# Patient Record
Sex: MALE | Race: ASIAN | URBAN - METROPOLITAN AREA
[De-identification: names, ages, dates, MRNs, and addresses within clinical notes are randomized per-mention and may not be internally consistent; named-entity substitution may affect disease eponyms.]

---

## 2022-03-16 ENCOUNTER — OFFICE VISIT (OUTPATIENT)
Dept: INTERNAL MEDICINE CLINIC | Age: 22
End: 2022-03-16
Payer: COMMERCIAL

## 2022-03-16 VITALS
WEIGHT: 141 LBS | DIASTOLIC BLOOD PRESSURE: 72 MMHG | HEIGHT: 69 IN | OXYGEN SATURATION: 100 % | HEART RATE: 91 BPM | RESPIRATION RATE: 16 BRPM | TEMPERATURE: 98.3 F | BODY MASS INDEX: 20.88 KG/M2 | SYSTOLIC BLOOD PRESSURE: 127 MMHG

## 2022-03-16 DIAGNOSIS — Z00.00 WELLNESS EXAMINATION: Primary | ICD-10-CM

## 2022-03-16 PROCEDURE — 99385 PREV VISIT NEW AGE 18-39: CPT | Performed by: INTERNAL MEDICINE

## 2022-03-16 NOTE — PROGRESS NOTES
Faith Cardenas is a 24 y.o. male  Chief Complaint   Patient presents with    Establish Care     Visit Vitals  /72   Pulse 91   Temp 98.3 °F (36.8 °C)   Resp 16   Ht 5' 9\" (1.753 m)   Wt 141 lb (64 kg)   SpO2 100%   BMI 20.82 kg/m²          HPI  Mr. Rika Tolbert is a 25 yo male with no significant medical history presents to clinic to [facility]. He doesn't have acute complaints on this visit. He is generally healthy, no complaints on this visit. He denies chest pain, shortness of breath, fever, or chills. He reports dental pain and plans to go to Dental office. Patient was scheduled to this visit through the immigration office. He had MMR, varicella, tdap and polio vaccines, quantiferon gold was negative. Social History   - and his wife is in [place]   -doesn't smoke, doesn't drink     Family history   Father: cariomyopathy       . History reviewed. No pertinent past medical history. No Known Allergies       ROS  Review of Systems   All other systems reviewed and are negative. EXAM  Physical Exam  Vitals reviewed. Constitutional:       General: He is not in acute distress. Appearance: Normal appearance. He is not ill-appearing. HENT:      Head: Normocephalic and atraumatic. Right Ear: Tympanic membrane, ear canal and external ear normal. There is no impacted cerumen. Left Ear: Tympanic membrane, ear canal and external ear normal. There is no impacted cerumen. Mouth/Throat:      Mouth: Mucous membranes are moist.   Eyes:      General:         Right eye: No discharge. Extraocular Movements: Extraocular movements intact. Conjunctiva/sclera: Conjunctivae normal.      Pupils: Pupils are equal, round, and reactive to light. Neck:      Vascular: No carotid bruit. Cardiovascular:      Rate and Rhythm: Normal rate and regular rhythm. Heart sounds: Normal heart sounds. No murmur heard. No gallop.     Pulmonary:      Effort: Pulmonary effort is normal.      Breath sounds: Normal breath sounds. No stridor. No wheezing or rhonchi. Abdominal:      General: There is no distension. Palpations: Abdomen is soft. There is no mass. Musculoskeletal:      Cervical back: Normal range of motion and neck supple. No rigidity or tenderness. Right lower leg: No edema. Left lower leg: No edema. Lymphadenopathy:      Cervical: No cervical adenopathy. Skin:     General: Skin is warm. Neurological:      General: No focal deficit present. Mental Status: He is alert and oriented to person, place, and time. Psychiatric:         Mood and Affect: Mood normal.         Health Maintenance Due   Topic Date Due    Hepatitis C Screening  Never done    Depression Screen  Never done    COVID-19 Vaccine (1) Never done    HPV Age 9Y-34Y (3 - Male 2-dose series) Never done    Flu Vaccine (1) Never done     ASSESSMENT/PLAN  Diagnoses and all orders for this visit:    1.  Wellness examination    -encouraged healthy life style : exercise and wholesome balanced diets   -no signs of depression seen   -return as needed or in 1 year       Kaen Chase MD

## 2022-03-16 NOTE — PATIENT INSTRUCTIONS
Ñ?ã ÓáÇãÊ ÈÑÇ? ÞáÈ: ãÑÇÞÈÊ åÇ? ÈåÏÇÔÊ? Heart-Healthy Diet: Care Instructions  ãÑÇÞÈÊ åÇ? ÈåÏÇÔÊ? ÔãÇ    Ñ? ã ÓÇáã ÈÑÇ? ÞáÈ ÍÇæ? ãÞÏÇÑ Ò? ÇÏ? ÓÈÒ? ÌÇÊ¡ ã? æå¡ ÎÔÈÇÑ¡ ÏÇäå æ ÏÇäå åÇ? Çãá ÇÓÊ å ãÞÏÇÑ äã ÏÑ Âä ã ÈÇÔÏ. ÏÑÇ?ä Ñ? ã ãæÇÏ ÛÐÇ? ? ÑÑÈ ãËá æÔÊ¡ ä?Ñ æ ÛÐÇåÇ? ÓÑÎ ÔÏå Èå ãÞÏÇÑ ã? æÌæÏ ÏÇÑÏ. ããä ÇÓÊ ÊÛ? ?Ñ Ñ?ã ÛÐÇ? ? ÈÑÇ? ÊÇä ÓÎÊ ÈÇÔÏ ÇãÇ ÍÊ? ÊÛ??ÑÇÊ æ ä? Ò ã? ÊæÇäÏ ÎØÑ Íãáå ÞáÈ? æ È? ãÇÑ? åÇ? ÞáÈ? ÑÇ ã ääÏ. ãÑÇÞÈÊ ? ? ÑÇäå ? Candy Cliche ÇÕá? ÇÒ ÏÑãÇä æ Ç?ãä? ÔãÇ ÇÓÊ. ÍÊãÇð ÇÒ ÒÔ ÎæÏ æÞÊ åÇ? ãáÇÞÇÊ È? Ñ? Ï æ ÏÑ åãå ÂäåÇ ÍÖæÑ ÏÇÔÊå ÈÇÔ? Ï æ ÇÑ ãÔá? ÏÇÑ? Ï ÈÇ ÒÔ ÎæÏ ÊãÇÓ È? Ñ? Ï. ÝÑ ÈÓ? ÇÑ ÎæÈ? ÇÓÊ å äÊÇ? Ì ÂÒãÇ? Alan Hernadez ÈÏÇä? Ï æ á? Kip Pata ÏÇÑæåÇ? ? Margarie Creamer ãÕÑÝ ã? ä?Ï¡ ÏÇÔÊå ÈÇÔ? Zenovia Netter ã? ?ÊæÇä?Ï ÏÑ ÎÇäå ÇÒ ÎæÏ ãÑÇÞÈÊ ä? Palma Husbands ÍÌã ÛÐÇåÇ? ãÕÑÝ? ÊÇä ÏÞÊ ä? Ï  Billye Dies ÈÑÓÈ åÇ? ÛÐÇ?? ÇÓÊÝÇÏå ä?Ï æ ÈÇ ÛÐÇåÇ? ÊæÕ? å ÔÏå æ ãäÇÓÈ ÈÑÇ? ÎæÏÊÇä ÂÔäÇ Ôæ? Josemanuel Harris åãÇä ãÞÏÇÑ ÇáÑ? ÑÇ ãÕÑÝ ä?Ï å ÈÑÇ? ÍÝÙ æÒä ÓÇáã Èå Âä ä? ÇÒ ÏÇÑ? Ï. ÇÑ áÇÒã ÇÓÊ æÒäÊÇä ÑÇ ã ä? Ï¡ ã? ÒÇä ÇáÑ? ãÕÑÝ? ÊÇä ÈÇ? Ï ãÊÑ ÇÒ ãÞÏÇÑ ÇáÑ? ÈÇÔÏ å ÈÏäÊÇä ã? ÓæÒÇäÏ (ÇÒ ØÑ?Þ æÑÒÔ ? Ç Ï? Ñ ÝÚÇá? Ê åÇ? Ý?Ò??). ã?æå æ ÓÈÒ? ÌÇÊ È? ÔÊÑ? ÈÎæÑ? Ï   åÑ ÑæÒ ã? æå æ ÓÈÒ? ÌÇÊ ãÊäæÚ? ÈÎæÑ? Ï. Èå ÎÕæÕ ã? æå åÇ æ ÓÈÒ? ÌÇÊ Èå Ñä ÓÈÒ Ê? Ñå¡ äÇÑäÌ? ¡ ÞÑãÒ ? Ç ÒÑÏ Ê? Ñå ÈÑÇ? ÊÇä ãäÇÓÈ åÓÊäÏ. äãæäå åÇ? ? ÇÒ Ç?ä ãæÇÑÏ ÚÈÇÑÊäÏ ÇÒ ÇÓÝäÇÌ¡ åæ?Ì¡ åáæ æ ÇäæÇÚ ÊæÊ æ ÊãÔ.  åæ? Ì¡ ÑÝÓ æ ÓÇ? Ñ ÓÈÒ? ÌÇÊ ÑÇ Èå ÚäæÇä ã? Çä æÚÏå ÏÑ ÏÓÊÑÓ ÏÇÔÊå ÈÇÔ? Ï. ã?æå åÇ? ÝÕá ÑÇ ÎÑ? ÏÇÑ? ä?Ï æ ÂäåÇ ÑÇ ÏÑ ÌÇ?? ÈÐÇÑ? Ï å æÓæÓå Ôæ?Ï ÇÒ ÂäåÇ ÈÎæÑ? Michael Kierra ÛÐÇåÇ? ? ÈÒ? Ï å ÍÇæ? ÓÈÒ? ÌÇÊ Ò? ÇÏ? åÓÊäÏ ãËá Óæ æ äæÏá ÈÇ ÓÈÒ? ÌÇÊ ÓÑÎ ÔÏå. ã?ÒÇä ÑÈ? åÇ? ÇÔÈÇÚ ÔÏå ÑÇ ÇåÔ Ïå? Ï   ÈÑÓÈ Ñæ? ÛÐÇåÇ ÑÇ ÈÎæÇä? Ï æ ÑÈ? åÇ? ÇÔÈÇÚ ÔÏå äÎÑ? Ï. Ç?ä ãæÇÑÏ ÈÇÚË ÇÝÒÇ? Milus Notch ÈÑæÒ È? ãÇÑ? åÇ? ÞáÈ? ã? ÔæäÏ.  ÈÑÇ? ÂÔÒ? ÇÒ ÑæÛä Ò? Êæä ? Ç ÇäæáÇ ÇÓÊÝÇÏå ä?Ï.  ÈÌÇ? ÓÑÎ ÑÏä ÛÐÇåÇ¡ ÂäåÇ ÑÇ ÈÒ? Ï¡ ÂÈ Ò ä? Ï¡ ÈÇÈ ? Ç ÈÎÇÑÒ ä?Ï. Huron Shanthi ÌÇ? æÔÊ åÇ?? ÈÇ ÑÈ? ÈÇáÇ ãËá åÇÊ ÏÇ æ ÓæÓ? Ó ÇÒ æÔÊ åÇ?? ÈÇ ÑÈ? ã [id number] äÏ? Sherryle Moder æÞÊ? æÔÊ ÂãÇÏå ã?  ä?Ï åãå ÑÈ? åÇ? ÞÇÈá ãÔÇåÏå ÑÇ ÌÏÇ ä?Ï. Maryella Byers ÌÇ? æÔÊ åÇ? ÑÑÈ¡ ÇÒ ãÇå? ¡ ãÇ? Çä ÈÏæä æÓÊ æ ÌÇ?Ò?ä åÇ? æÔÊ ãËá [id number] Óæ?Ç ÇÓÊÝÇÏå ä?Ï. [id number] Óæ?Ç ãËá ÊæÝæ ããä ÇÓÊ ÛÐÇåÇ? ÈÓ? ÇÑ ÎæÈ? ÈÑÇ? ÞáÈ ÈÇÔäÏ.  Ô? [address] [id number] áÈä? ã ÑÈ ? Ç ÈÏæä ÑÈ? ÇäÊÎÇÈ ä?Ï. ÛÐÇåÇ? ? ÈÎæÑ? Telma Martinez ÍÌã Ý? ÈÑ ÈÇáÇÊÑ? ÏÇÑäÏ   åÑ ÑæÒ ÏÇäå ÛáÇÊ ÈÎæÑ? Ï. ÛÐÇåÇ? ? ãÕÑÝ ä?Ï å ÏÇäå Çãá ÛáÇÊ ÏÇÔÊå ÈÇÔäÏ æ ÍÌã ÈÇáÇ? ? ÇÒ Ý? ÈÑ æ ãæÇÏ ãÛÐ? Leatha Tanner ÎæÏ ÌÇ? ÏåäÏ. äãæäå åÇ? ? ÇÒ ÛÐÇåÇ? ÍÇæ? ÏÇäå Çãá ÚÈÇÑÊäÏ ÇÒ Ìæ Ïæ ÓÑ¡ äÇä åÇ? ÎÊå ÔÏå ÈÇ äÏã Çãá æ ÈÑäÌ Þåæå Ç? Kianna Massa äÇä æ ÊÑ? ÈÇÊ Êå? å ÔÏå ÇÒ ÏÇäå ÛáÇÊ Çãá ÑÇ Èå ÌÇ? äÇä æ Ô? Ñ?ä? åÇ? Êå?å ÔÏå ÇÒ äÏã ãÕÑÝ ä?Ï. ã?ÒÇä ãÕÑÝ äã æ ÓÏ? ã ÑÇ ãÍÏæÏ ä? Ï   ã? ÒÇä ãÕÑÝ äã æ ÓÏ? ã ÑÇ ÇåÔ Ïå? Ï ÊÇ ÝÔÇÑ ÎæäÊÇä ã ÔæÏ.  ÞÈá ÇÒ äã ÒÏä Èå ÛÐÇ¡ Âä ÑÇ [id number] ä? Marvie Carls? ÝÑ ã? ä?Ï áÇÒã ÇÓÊ Èå ÛÐÇ äã ÈÒä? Ï¡ ÝÞØ ãÞÏÇÑ ã? äã ÇÖÇÝå ä?Ï Èå ãÑæÑ ÒãÇä ÐÇÆÞå ÊÇä Èå ã ÈæÏä äã ÚÇÏÊ ã? äÏ.  ãÞÏÇÑ ãÊÑ? LIUYSP¡ ÛÐåÇ? ÂãÇÏå æ Ï? Ñ ãæÇÏ ÛÐÇ? ? ÝÑÂæÑ? ÔÏå ÈÎæÑ? Telma Martinez ÍÌã ÈÇáÇ? ? ÇÒ äã ÏÇÑäÏ. ÈÑÓÈ ãæÇÏ ÛÐÇ? ? ÑÇ ÈÑÑÓ? ä?Ï æ ã? ÒÇä ÓÏ? ã ãæÌæÏ ÏÑ ãæÇÏ ÛÐÇ? ? ÑÇ ÈÈ?ä? Ï.  ÛÐÇåÇ? äÓÑæ? ÑÇ ÇäÊÎÇÈ ä?Ï å ÓÏ? ã ã? ÏÇÑäÏ (ãËá Óæ¡ ÓÈÒ? ÌÇÊ æ áæÈ? Ç). ã?ÒÇä ãÕÑÝ ÔÑ ÑÇ ãÍÏæÏ ä? Nicole Caldron ãÕÑÝ äæÔ? Ïä? åÇ æ ãæÇÏ ÛÐÇ? ? ÍÇæ? ÔÑ ÇÖÇÝ? ÑÇ ãÍÏæÏ ä?Ï. Ç?ä ãæÇÏ ÚÈÇÑÊäÏ ÇÒ ÂÈ äÈÇÊ¡ ÏÓÑ æ äæÔÇÈå åÇ? ÇÒÏÇÑ. ã?ÒÇä ãÕÑÝ Çáá ÑÇ ã ä?Ï.  ãÑÏÇä ÑæÒÇäå È? ÔÊÑ ÇÒ 2 ÈÇÑ æ ÒäÇä È? ÔÊÑ ÇÒ 1 ÈÇÑ Çáá ãÕÑÝ äääÏ. äæÔ? Ïä Çáá Ò? ÇÏ ÈÇÚË È? ãÇÑ? ã? ÔæÏ. å ãæÞÚ ÈÇ? Ï ÈÑÇ? ã ÊãÇÓ È? Ñ? Ï¿  Èå ÏÞÊ ãÑÇÞÈ ÊÛ? ?Kyleigh Fernandez æÖÚ? Ê ÓáÇãÊ? ÎæÏ ÈÇÔ? Ï æ ÏÑ ãæÇÑÏ Ò? Ñ ÍÊãÇð ÈÇ ÒÔ ÎæÏ ÊãÇÓ È? Ñ? ÏRegan Del ä?Ï ÛÐÇåÇ? ? Êå?å ä?Ï å ÈÑÇ? ÓáÇãÊ ÞáÈ ãÝ? Ï ÈÇÔäÏ. ÇÒ ÌÇ ã? ÊæÇä? Ï ÇØáÇÚÇÊ È? ÔÊÑ? ÓÈ ä? Ï¿  ÈÑæ Èå   [email or web address]  æÑæÏ T026 ÏÑ ÇÏÑ ÌÓÊÌæ ÈÑÇ? ÇØáÇÚÇÊ È? Ô? ÊÑ ÏÑÈÇÑå \"Ñ? ã ÓáÇãÊ ÈÑÇ? ÞáÈ: ãÑÇÞÈÊ åÇ? ÈåÏÇÔÊ? Haydee Song \"  Èå ÑæÒ Èå ÊÇÑ? Î: 10 Çäæíå 2022               äÓÎå ÏÇÑÇ? ãÍÊæÇ: 13.2  © 4088-9661 [employer]. ãÑÇÞÈÊ åÇ? ÈåÏÇÔÊ?  ÈÑÇÓÇÓ ãÌæÒ ãÊÎÕÕ ãÑÇÞÈ ÓáÇãÊ ÔãÇ ãØÇÈÞÊ ÏÇÏå ÔÏå ÇÓÊ. ÇÑ ÏÑÈÇÑå ÔÑÇ? Ø ÒÔ? ?Lonza Grosser Ç?ä [id number] åÇ ÓÄÇá? ÏÇÑ? Ï¡ åã? Ôå ã? ÊæÇä? Ï ÇÒ ãÊÎÕÕ?ä ÍæÒå ÓáÇãÊ ÓÄÇá ä? Ï. [employer] åÑæäå ÖãÇäÊ äÇãå ? Ç ÊÚåÏ ÏÑ ÞÈÇá ÇÓÊÝÇÏå ÇÒ Ç?ä XMEITYR ÑÇ ÇÒ ÎæÏ ÓáÈ ã? äÏ.

## 2022-03-16 NOTE — PROGRESS NOTES
1. Have you been to the ER, urgent care clinic since your last visit? Hospitalized since your last visit? No    2. Have you seen or consulted any other health care providers outside of the [address] [place] [address] since your last visit? Include any pap smears or colon screening.  No   Chief Complaint   Patient presents with   [address]

## 2022-08-24 ENCOUNTER — NURSE TRIAGE (OUTPATIENT)
Dept: OTHER | Facility: CLINIC | Age: 22
End: 2022-08-24

## 2022-08-24 NOTE — TELEPHONE ENCOUNTER
Received call from [address] at [facility] with Red Flag Complaint. Pt speaks English. RN noted that pt was speaking, but he speaks very slowly. RN told pt and pt's cousin that there is a translation service and that we can obtain an  who speaks his first language. Pt expressed that was good and he was appreciative. Subjective: Caller states \"He was resting in a side position and awakened with pain in his R shoulder and neck. And the pain goes up in the R neck but can sometimes be felt around his neck. Onset:   Since Saturday, 8/20/22     Pain Severity: 8/10; intermittent  And sometimes lower, at a 5    What has been tried: tylenol - hasn't helped much    Recommended disposition: See PCP within 3 Days    Care advice provided, patient verbalizes understanding; denies any other questions or concerns; instructed to call back for any new or worsening symptoms. Giorgio Andrew, Service Expert, is working to make an appt for pt. Attention Provider: Thank you for allowing me to participate in the care of your patient. The patient was connected to triage in response to information provided to the [facility]. Please do not respond through this encounter as the response is not directed to a shared pool.     Reason for Disposition   [1] MODERATE pain (e.g., interferes with normal activities) AND [2] present > 3 days    Protocols used: Shoulder Pain-ADULT-AH

## 2022-08-25 ENCOUNTER — OFFICE VISIT (OUTPATIENT)
Dept: INTERNAL MEDICINE CLINIC | Age: 22
End: 2022-08-25
Payer: COMMERCIAL

## 2022-08-25 VITALS
TEMPERATURE: 98.5 F | OXYGEN SATURATION: 98 % | SYSTOLIC BLOOD PRESSURE: 123 MMHG | HEART RATE: 78 BPM | BODY MASS INDEX: 21.48 KG/M2 | DIASTOLIC BLOOD PRESSURE: 75 MMHG | RESPIRATION RATE: 18 BRPM | WEIGHT: 145 LBS | HEIGHT: 69 IN

## 2022-08-25 DIAGNOSIS — M79.18 MUSCULOSKELETAL PAIN: Primary | ICD-10-CM

## 2022-08-25 PROCEDURE — 99213 OFFICE O/P EST LOW 20 MIN: CPT | Performed by: INTERNAL MEDICINE

## 2022-08-25 RX ORDER — CYCLOBENZAPRINE HCL 10 MG
10 TABLET ORAL
Qty: 30 TABLET | Refills: 0 | Status: SHIPPED | OUTPATIENT
Start: 2022-08-25 | End: 2022-09-24

## 2022-08-25 RX ORDER — MELOXICAM 7.5 MG/1
7.5 TABLET ORAL DAILY
Qty: 30 TABLET | Refills: 0 | Status: SHIPPED | OUTPATIENT
Start: 2022-08-25 | End: 2022-09-24

## 2022-08-25 NOTE — LETTER
8/25/2022 3:58 PM    Mr. Pat Cruz  [address] [facility]      Mr. Pat Cruz was seen today on 8/25/22 and he has musculoskeletal pain which gets worst with heavy lifting. I recommend to limit the amount of lifting to 20 lbs for a few days until he gets better.          Sincerely,      Shilpi Horta MD

## 2022-08-25 NOTE — PROGRESS NOTES
Linda Chi is a 24 y.o. male    Chief Complaint   Patient presents with    Shoulder Pain     And rt side neck pain since 08/20/2022       Visit Vitals  /75   Pulse 78   Temp 98.5 °F (36.9 °C) (Temporal)   Resp 18   Ht 5' 9\" (1.753 m)   Wt 145 lb (65.8 kg)   SpO2 98%   BMI 21.41 kg/m²       3 most recent PHQ Screens 8/25/2022   Little interest or pleasure in doing things Not at all   Feeling down, depressed, irritable, or hopeless Not at all   Total Score PHQ 2 0       No flowsheet data found. Abuse Screening Questionnaire 3/16/2022   Do you ever feel afraid of your partner? N   Are you in a relationship with someone who physically or mentally threatens you? N   Is it safe for you to go home? Y       1. Have you been to the ER, urgent care clinic since your last visit? Hospitalized since your last visit? No     2. Have you seen or consulted any other health care providers outside of the [address] [place] [address] since your last visit? Include any pap smears or colon screening.  No

## 2022-08-25 NOTE — PROGRESS NOTES
[facility] Notice is a 24 y.o. male  Chief Complaint   Patient presents with    Shoulder Pain     And rt side neck pain since 08/20/2022     Visit Vitals  /75   Pulse 78   Temp 98.5 °F (36.9 °C) (Temporal)   Resp 18   Ht 5' 9\" (1.753 m)   Wt 145 lb (65.8 kg)   SpO2 98%   BMI 21.41 kg/m²          HPI  Mr.Laal Alana Hui present with sudden onset of right scapular pain of 3 days duration. It started suddenly and described as aching sensation that gets exacerbated with lifting heavy objects or any movement. He denies any trauma. He works in [employer] and work involves lifting objects. .  Social History     Socioeconomic History    Marital status: SINGLE   Tobacco Use    Smoking status: Never    Smokeless tobacco: Never   Vaping Use    Vaping Use: Never used   Substance and Sexual Activity    Alcohol use: Never    Drug use: Never    Sexual activity: Never      . History reviewed. No pertinent past medical history. No Known Allergies       ROS  Review of Systems   All other systems reviewed and are negative. EXAM  Physical Exam  Vitals reviewed. Constitutional:       Appearance: Normal appearance. Cardiovascular:      Rate and Rhythm: Normal rate. Heart sounds: Normal heart sounds. No murmur heard. Musculoskeletal:        Arms:       Cervical back: Normal range of motion. Neurological:      Mental Status: He is alert. Health Maintenance Due   Topic Date Due    HPV Age 9Y-34Y (3 - Male 2-dose series) Never done    COVID-19 Vaccine (3 - Booster) 02/28/2022       ASSESSMENT/PLAN    Diagnoses and all orders for this visit:    1. Musculoskeletal pain  -     cyclobenzaprine (FLEXERIL) 10 mg tablet; Take 1 Tablet by mouth three (3) times daily as needed for Muscle Spasm(s) for up to 30 days. -     meloxicam (MOBIC) 7.5 mg tablet; Take 1 Tablet by mouth daily for 30 days.         Valentina Phillips MD

## 2022-11-17 ENCOUNTER — OFFICE VISIT (OUTPATIENT)
Dept: INTERNAL MEDICINE CLINIC | Age: 22
End: 2022-11-17
Payer: COMMERCIAL

## 2022-11-17 VITALS
HEIGHT: 69 IN | WEIGHT: 147.6 LBS | DIASTOLIC BLOOD PRESSURE: 68 MMHG | SYSTOLIC BLOOD PRESSURE: 124 MMHG | BODY MASS INDEX: 21.86 KG/M2 | TEMPERATURE: 98.3 F | HEART RATE: 73 BPM | OXYGEN SATURATION: 99 % | RESPIRATION RATE: 20 BRPM

## 2022-11-17 DIAGNOSIS — M54.50 ACUTE MIDLINE LOW BACK PAIN WITHOUT SCIATICA: Primary | ICD-10-CM

## 2022-11-17 PROCEDURE — 99213 OFFICE O/P EST LOW 20 MIN: CPT | Performed by: INTERNAL MEDICINE

## 2022-11-17 RX ORDER — IBUPROFEN 800 MG/1
TABLET ORAL
COMMUNITY
Start: 2022-11-14

## 2022-11-17 RX ORDER — METHYLPREDNISOLONE 4 MG/1
TABLET ORAL
Qty: 1 DOSE PACK | Refills: 0 | Status: SHIPPED | OUTPATIENT
Start: 2022-11-17

## 2022-11-17 NOTE — PROGRESS NOTES
Jorge Luis Vinson is a 25 y.o. male  Chief Complaint   Patient presents with    Back Pain     Visit Vitals  /68 (BP 1 Location: Left upper arm, BP Patient Position: Sitting, BP Cuff Size: Adult)   Pulse 73   Temp 98.3 °F (36.8 °C) (Oral)   Resp 20   Ht 5' 9\" (1.753 m)   Wt 147 lb 9.6 oz (67 kg)   SpO2 99%   BMI 21.80 kg/m²          HPI  Mr.Laal Humera Delgadillo presents to clinic with severe low back pain,  the pains worsens when he bends forward and used ibuprofen without benefit. No relieving or worsening factor noted. No trauma. Social History     Socioeconomic History    Marital status: SINGLE   Tobacco Use    Smoking status: Never    Smokeless tobacco: Never   Vaping Use    Vaping Use: Never used   Substance and Sexual Activity    Alcohol use: Never    Drug use: Never    Sexual activity: Never      . History reviewed. No pertinent past medical history. No Known Allergies       ROS  Review of Systems   All other systems reviewed and are negative. EXAM  Physical Exam  Cardiovascular:      Rate and Rhythm: Normal rate and regular rhythm. Heart sounds: Normal heart sounds. No murmur heard. Back: non tender     Health Maintenance Due   Topic Date Due    COVID-19 Vaccine (3 - Booster) 11/24/2021    Flu Vaccine (1) Never done       ASSESSMENT/PLAN    Diagnoses and all orders for this visit:    1.  Acute midline low back pain without sciatica  -     methylPREDNISolone (MEDROL DOSEPACK) 4 mg tablet; Use as directed      Adrian Bartholomew MD

## 2022-11-17 NOTE — PROGRESS NOTES
Patient c/o back pain since last Wednesday. No chief complaint on file. Vitals:    11/17/22 0744   Temp: 98.3 °F (36.8 °C)   TempSrc: Oral   Weight: 147 lb 9.6 oz (67 kg)   PainSc:   8   PainLoc: Back       Current Outpatient Medications on File Prior to Visit   Medication Sig Dispense Refill    ibuprofen (MOTRIN) 800 mg tablet        No current facility-administered medications on file prior to visit. Health Maintenance Due   Topic    COVID-19 Vaccine (3 - Booster)    Flu Vaccine (1)       1. \"Have you been to the ER, urgent care clinic since your last visit? Hospitalized since your last visit? \" No    2. \"Have you seen or consulted any other health care providers outside of the [address] [place] [address] since your last visit? \"    Yes, Covid/neg    3. For patients aged 39-70: Has the patient had a colonoscopy / FIT/ Cologuard? No      If the patient is female:    4. For patients aged 41-77: Has the patient had a mammogram within the past 2 years? NA - based on age or sex      11. For patients aged 21-65: Has the patient had a pap smear?  NA - based on age or sex

## 2022-12-05 ENCOUNTER — OFFICE VISIT (OUTPATIENT)
Dept: INTERNAL MEDICINE CLINIC | Age: 22
End: 2022-12-05
Payer: COMMERCIAL

## 2022-12-05 ENCOUNTER — HOSPITAL ENCOUNTER (OUTPATIENT)
Dept: GENERAL RADIOLOGY | Age: 22
Discharge: HOME | End: 2022-12-05
Attending: INTERNAL MEDICINE
Payer: COMMERCIAL

## 2022-12-05 VITALS
HEIGHT: 69 IN | DIASTOLIC BLOOD PRESSURE: 88 MMHG | WEIGHT: 148 LBS | BODY MASS INDEX: 21.92 KG/M2 | TEMPERATURE: 97.7 F | RESPIRATION RATE: 17 BRPM | OXYGEN SATURATION: 99 % | HEART RATE: 80 BPM | SYSTOLIC BLOOD PRESSURE: 130 MMHG

## 2022-12-05 DIAGNOSIS — M54.50 ACUTE MIDLINE LOW BACK PAIN WITHOUT SCIATICA: Primary | ICD-10-CM

## 2022-12-05 DIAGNOSIS — M54.50 ACUTE MIDLINE LOW BACK PAIN WITHOUT SCIATICA: ICD-10-CM

## 2022-12-05 PROCEDURE — 72100 X-RAY EXAM L-S SPINE 2/3 VWS: CPT

## 2022-12-05 PROCEDURE — 99213 OFFICE O/P EST LOW 20 MIN: CPT | Performed by: INTERNAL MEDICINE

## 2022-12-05 RX ORDER — MELOXICAM 7.5 MG/1
7.5 TABLET ORAL DAILY
Qty: 30 TABLET | Refills: 0 | Status: SHIPPED | OUTPATIENT
Start: 2022-12-05 | End: 2023-01-04

## 2022-12-05 RX ORDER — TIZANIDINE 2 MG/1
2 TABLET ORAL
Qty: 30 TABLET | Refills: 0 | Status: SHIPPED | OUTPATIENT
Start: 2022-12-05

## 2022-12-05 NOTE — PROGRESS NOTES
Chief Complaint   Patient presents with    Back Pain       Visit Vitals  /88   Pulse 80   Temp 97.7 °F (36.5 °C)   Resp 17   Ht 5' 9\" (1.753 m)   Wt 148 lb (67.1 kg)   SpO2 99%   BMI 21.86 kg/m²

## 2022-12-05 NOTE — PROGRESS NOTES
Pardeep Sender is a 25 y.o. male  Chief Complaint   Patient presents with    Back Pain     Visit Vitals  /88   Pulse 80   Temp 97.7 °F (36.5 °C)   Resp 17   Ht 5' 9\" (1.753 m)   Wt 148 lb (67.1 kg)   SpO2 99%   BMI 21.86 kg/m²          HPI  Mr.Laal Liliam Bonner presents for low back pain of over a month duration. He was given steroid on last visit and didn't help with his symptoms. He reports stabbing sever back pain whenever he tries to work. He denies radicular pian, no neurologic deficit. Ibuprofen doesn't help with the       . Social History     Socioeconomic History    Marital status: SINGLE   Tobacco Use    Smoking status: Never    Smokeless tobacco: Never   Vaping Use    Vaping Use: Never used   Substance and Sexual Activity    Alcohol use: Never    Drug use: Never    Sexual activity: Never      . History reviewed. No pertinent past medical history. No Known Allergies       ROS  Review of Systems   All other systems reviewed and are negative. EXAM  Physical Exam  Vitals reviewed. Musculoskeletal:        Back:      Health Maintenance Due   Topic Date Due    COVID-19 Vaccine (3 - Booster) 11/24/2021    Flu Vaccine (1) Never done       ASSESSMENT/PLAN    Diagnoses and all orders for this visit:    1. Acute midline low back pain without sciatica  -     XR SPINE LUMB 2 OR 3 V; Future  -     meloxicam (MOBIC) 7.5 mg tablet; Take 1 Tablet by mouth daily for 30 days.  -     REFERRAL TO PHYSICAL THERAPY  -     tiZANidine (ZANAFLEX) 2 mg tablet; Take 1 Tablet by mouth three (3) times daily as needed for Muscle Spasm(s).           Luann Kidd MD

## 2023-01-12 ENCOUNTER — OFFICE VISIT (OUTPATIENT)
Dept: INTERNAL MEDICINE CLINIC | Age: 23
End: 2023-01-12
Payer: COMMERCIAL

## 2023-01-12 ENCOUNTER — TELEPHONE (OUTPATIENT)
Dept: ORTHOPEDIC SURGERY | Age: 23
End: 2023-01-12

## 2023-01-12 VITALS
OXYGEN SATURATION: 98 % | WEIGHT: 149 LBS | RESPIRATION RATE: 14 BRPM | DIASTOLIC BLOOD PRESSURE: 59 MMHG | BODY MASS INDEX: 22.07 KG/M2 | HEART RATE: 69 BPM | SYSTOLIC BLOOD PRESSURE: 128 MMHG | HEIGHT: 69 IN | TEMPERATURE: 97.3 F

## 2023-01-12 DIAGNOSIS — G89.29 CHRONIC MIDLINE LOW BACK PAIN WITHOUT SCIATICA: ICD-10-CM

## 2023-01-12 DIAGNOSIS — G89.29 CHRONIC MIDLINE LOW BACK PAIN WITHOUT SCIATICA: Primary | ICD-10-CM

## 2023-01-12 DIAGNOSIS — M54.50 CHRONIC MIDLINE LOW BACK PAIN WITHOUT SCIATICA: Primary | ICD-10-CM

## 2023-01-12 DIAGNOSIS — M54.50 CHRONIC MIDLINE LOW BACK PAIN WITHOUT SCIATICA: ICD-10-CM

## 2023-01-12 PROCEDURE — 99213 OFFICE O/P EST LOW 20 MIN: CPT | Performed by: INTERNAL MEDICINE

## 2023-01-12 NOTE — PROGRESS NOTES
Lemuel Alcantar is a 25 y.o. male  Chief Complaint   Patient presents with    Back Pain     Started three months ago - neck and back      Visit Vitals  BP (!) 128/59 (BP 1 Location: Left upper arm, BP Patient Position: Sitting, BP Cuff Size: Adult)   Pulse 69   Temp 97.3 °F (36.3 °C) (Temporal)   Resp 14   Ht 5' 9\" (1.753 m)   Wt 149 lb (67.6 kg)   SpO2 98%   BMI 22.00 kg/m²          HPI  Mr.Laal Benji Akbar presents again due to persistent low back pain. Meloxicam and tizanidine makes his causes difficulty with sleep and didn't help with the pain. [facility] went [facility] without any significant benefit. Social History     Socioeconomic History    Marital status: SINGLE   Tobacco Use    Smoking status: Never    Smokeless tobacco: Never   Vaping Use    Vaping Use: Never used   Substance and Sexual Activity    Alcohol use: Never    Drug use: Never    Sexual activity: Never      . History reviewed. No pertinent past medical history. No Known Allergies       ROS  Review of Systems   All other systems reviewed and are negative. EXAM  Physical Exam  Vitals reviewed. HENT:      Head: Normocephalic and atraumatic. Cardiovascular:      Heart sounds: Normal heart sounds. No murmur heard. Pulmonary:      Effort: Pulmonary effort is normal.      Breath sounds: Normal breath sounds. Neurological:      General: No focal deficit present. Mental Status: He is alert. Health Maintenance Due   Topic Date Due    COVID-19 Vaccine (3 - Booster) 11/24/2021    Flu Vaccine (1) Never done       ASSESSMENT/PLAN    Diagnoses and all orders for this visit:    1. Chronic midline low back pain without sciatica  -     REFERRAL TO ORTHOPEDICS  -     VITAMIN B12 & FOLATE; Future  -     METABOLIC PANEL, COMPREHENSIVE;  Future            Da Purvis MD

## 2023-01-12 NOTE — TELEPHONE ENCOUNTER
We received a referral from pcp to schedule an appt for back, left pt vm to call our office and schedule an appt

## 2023-01-13 LAB
ALBUMIN SERPL-MCNC: 4.5 G/DL (ref 3.5–5)
ALBUMIN/GLOB SERPL: 1.3 (ref 1.1–2.2)
ALP SERPL-CCNC: 66 U/L (ref 45–117)
ALT SERPL-CCNC: 19 U/L (ref 12–78)
ANION GAP SERPL CALC-SCNC: 3 MMOL/L (ref 5–15)
AST SERPL-CCNC: 19 U/L (ref 15–37)
BILIRUB SERPL-MCNC: 1.1 MG/DL (ref 0.2–1)
BUN SERPL-MCNC: 17 MG/DL (ref 6–20)
BUN/CREAT SERPL: 18 (ref 12–20)
CALCIUM SERPL-MCNC: 9.3 MG/DL (ref 8.5–10.1)
CHLORIDE SERPL-SCNC: 104 MMOL/L (ref 97–108)
CO2 SERPL-SCNC: 31 MMOL/L (ref 21–32)
CREAT SERPL-MCNC: 0.95 MG/DL (ref 0.7–1.3)
FOLATE SERPL-MCNC: 18.4 NG/ML (ref 5–21)
GLOBULIN SER CALC-MCNC: 3.5 G/DL (ref 2–4)
GLUCOSE SERPL-MCNC: 101 MG/DL (ref 65–100)
POTASSIUM SERPL-SCNC: 4 MMOL/L (ref 3.5–5.1)
PROT SERPL-MCNC: 8 G/DL (ref 6.4–8.2)
SODIUM SERPL-SCNC: 138 MMOL/L (ref 136–145)
VIT B12 SERPL-MCNC: 267 PG/ML (ref 193–986)

## 2023-03-16 ENCOUNTER — OFFICE VISIT (OUTPATIENT)
Dept: INTERNAL MEDICINE CLINIC | Age: 23
End: 2023-03-16
Payer: COMMERCIAL

## 2023-03-16 VITALS
WEIGHT: 150 LBS | HEART RATE: 71 BPM | OXYGEN SATURATION: 98 % | HEIGHT: 69 IN | RESPIRATION RATE: 20 BRPM | TEMPERATURE: 98.2 F | BODY MASS INDEX: 22.22 KG/M2 | SYSTOLIC BLOOD PRESSURE: 111 MMHG | DIASTOLIC BLOOD PRESSURE: 64 MMHG

## 2023-03-16 DIAGNOSIS — H66.90 ACUTE OTITIS MEDIA, UNSPECIFIED OTITIS MEDIA TYPE: Primary | ICD-10-CM

## 2023-03-16 PROCEDURE — 99213 OFFICE O/P EST LOW 20 MIN: CPT | Performed by: INTERNAL MEDICINE

## 2023-03-16 RX ORDER — AMOXICILLIN AND CLAVULANATE POTASSIUM 875; 125 MG/1; MG/1
1 TABLET, FILM COATED ORAL EVERY 12 HOURS
Qty: 14 TABLET | Refills: 0 | Status: SHIPPED | OUTPATIENT
Start: 2023-03-16 | End: 2023-03-23

## 2023-03-16 RX ORDER — DICLOFENAC SODIUM 75 MG/1
75 TABLET, DELAYED RELEASE ORAL AS NEEDED
COMMUNITY
Start: 2023-01-26

## 2023-03-16 NOTE — PROGRESS NOTES
Maycol Calles is a 25 y.o. male  Chief Complaint   Patient presents with    Ear Fullness     Visit Vitals  /64 (BP 1 Location: Right upper arm, BP Patient Position: Sitting, BP Cuff Size: Adult)   Pulse 71   Temp 98.2 °F (36.8 °C) (Temporal)   Resp 20   Ht 5' 9\" (1.753 m)   Wt 150 lb (68 kg)   SpO2 98%   BMI 22.15 kg/m²          HPI    Hearing loss since last Monday, [facility] and was given afrin, flonase allegra without benefit. He endorses sore thoat and nasal congestion. The nasal congestion improved, his hearing and pain persisted. Annabelle Mackay No past medical history on file. ROS  Review of Systems   All other systems reviewed and are negative. EXAM  Physical Exam  Vitals reviewed. HENT:      Ears:        Comments: Full TM and erythema,  polyp on ear drum    Health Maintenance Due   Topic Date Due    COVID-19 Vaccine (3 - Booster) 11/24/2021    Flu Vaccine (1) Never done       ASSESSMENT/PLAN    Diagnoses and all orders for this visit:    1. Acute otitis media, unspecified otitis media type    Other orders  -     amoxicillin-clavulanate (AUGMENTIN) 875-125 mg per tablet; Take 1 Tablet by mouth every twelve (12) hours for 7 days.         Courtney Bartlett MD

## 2023-03-16 NOTE — PROGRESS NOTES
Patient is here for left ear pain and not able to hear that well out his left ear. No chief complaint on file. Vitals:    03/16/23 1430   Temp: 98.2 °F (36.8 °C)   TempSrc: Temporal   Weight: 150 lb (68 kg)       Current Outpatient Medications on File Prior to Visit   Medication Sig Dispense Refill    diclofenac EC (VOLTAREN) 75 mg EC tablet Take 75 mg by mouth as needed. tiZANidine (ZANAFLEX) 2 mg tablet Take 1 Tablet by mouth three (3) times daily as needed for Muscle Spasm(s). (Patient not taking: No sig reported) 30 Tablet 0    methylPREDNISolone (MEDROL DOSEPACK) 4 mg tablet Use as directed (Patient not taking: No sig reported) 1 Dose Pack 0     No current facility-administered medications on file prior to visit. Health Maintenance Due   Topic    COVID-19 Vaccine (3 - Booster)    Flu Vaccine (1)       1. \"Have you been to the ER, urgent care clinic since your last visit? Hospitalized since your last visit? \"    Patient went to [facility] last week for left ear pain. 2. \"Have you seen or consulted any other health care providers outside of the [address] [place] [address] since your last visit? \" No     3. For patients aged 39-70: Has the patient had a colonoscopy / FIT/ Cologuard? No      If the patient is female:    4. For patients aged 41-77: Has the patient had a mammogram within the past 2 years? NA - based on age or sex      11. For patients aged 21-65: Has the patient had a pap smear?  NA - based on age or sex

## 2023-05-10 ENCOUNTER — OFFICE VISIT (OUTPATIENT)
Age: 23
End: 2023-05-10
Payer: COMMERCIAL

## 2023-05-10 VITALS
TEMPERATURE: 97 F | WEIGHT: 144.4 LBS | OXYGEN SATURATION: 97 % | BODY MASS INDEX: 21.39 KG/M2 | HEART RATE: 88 BPM | DIASTOLIC BLOOD PRESSURE: 64 MMHG | SYSTOLIC BLOOD PRESSURE: 110 MMHG | HEIGHT: 69 IN | RESPIRATION RATE: 20 BRPM

## 2023-05-10 DIAGNOSIS — J30.1 SEASONAL ALLERGIC RHINITIS DUE TO POLLEN: Primary | ICD-10-CM

## 2023-05-10 DIAGNOSIS — J01.10 SUBACUTE FRONTAL SINUSITIS: ICD-10-CM

## 2023-05-10 PROCEDURE — 99213 OFFICE O/P EST LOW 20 MIN: CPT | Performed by: INTERNAL MEDICINE

## 2023-05-10 RX ORDER — AMOXICILLIN AND CLAVULANATE POTASSIUM 875; 125 MG/1; MG/1
1 TABLET, FILM COATED ORAL 2 TIMES DAILY
Qty: 14 TABLET | Refills: 0 | Status: SHIPPED | OUTPATIENT
Start: 2023-05-10 | End: 2023-05-17

## 2023-05-10 RX ORDER — FLUTICASONE PROPIONATE 50 MCG
1 SPRAY, SUSPENSION (ML) NASAL DAILY
Qty: 32 G | Refills: 1 | Status: SHIPPED | OUTPATIENT
Start: 2023-05-10

## 2023-05-10 SDOH — ECONOMIC STABILITY: FOOD INSECURITY: WITHIN THE PAST 12 MONTHS, YOU WORRIED THAT YOUR FOOD WOULD RUN OUT BEFORE YOU GOT MONEY TO BUY MORE.: NEVER TRUE

## 2023-05-10 SDOH — ECONOMIC STABILITY: FOOD INSECURITY: WITHIN THE PAST 12 MONTHS, THE FOOD YOU BOUGHT JUST DIDN'T LAST AND YOU DIDN'T HAVE MONEY TO GET MORE.: NEVER TRUE

## 2023-05-10 SDOH — ECONOMIC STABILITY: INCOME INSECURITY: HOW HARD IS IT FOR YOU TO PAY FOR THE VERY BASICS LIKE FOOD, HOUSING, MEDICAL CARE, AND HEATING?: NOT HARD AT ALL

## 2023-05-10 SDOH — ECONOMIC STABILITY: HOUSING INSECURITY
IN THE LAST 12 MONTHS, WAS THERE A TIME WHEN YOU DID NOT HAVE A STEADY PLACE TO SLEEP OR SLEPT IN A SHELTER (INCLUDING NOW)?: NO

## 2023-05-10 ASSESSMENT — ENCOUNTER SYMPTOMS
APNEA: 0
SINUS PAIN: 1
SORE THROAT: 0
VOICE CHANGE: 0
COUGH: 1
TROUBLE SWALLOWING: 0
EYE DISCHARGE: 0
SINUS PRESSURE: 1
RHINORRHEA: 1
STRIDOR: 0
WHEEZING: 0
SHORTNESS OF BREATH: 0

## 2023-05-10 NOTE — PROGRESS NOTES
Larry Cardenas is a 25 y.o. male  Chief Complaint   Patient presents with    Allergies     Vitals:    05/10/23 0856   BP: 110/64   Pulse: 88   Resp: 20   Temp: 97 °F (36.1 °C)   SpO2: 97%          HPI  Mr.Laal Lesly Westbrook presents to clinic due to seasonal allergies that progressed to face pain over the last two months. Associated symptoms include sinus pressure and cough productive of greenish phlegm and post nasal drip., fever and chills. He has recent ear infection as well. Anastasia Truong History reviewed. No pertinent past medical history. ROS  Review of Systems   Constitutional:  Positive for fever. Negative for activity change, appetite change, chills and diaphoresis. HENT:  Positive for congestion, postnasal drip, rhinorrhea, sinus pressure, sinus pain and sneezing. Negative for ear pain, sore throat, tinnitus, trouble swallowing and voice change. Eyes:  Negative for discharge. Respiratory:  Positive for cough. Negative for apnea, shortness of breath, wheezing and stridor. Endocrine: Negative for heat intolerance. Genitourinary:  Negative for difficulty urinating, dysuria and enuresis. Musculoskeletal:  Negative for arthralgias. Neurological:  Negative for dizziness and headaches. EXAM  Physical Exam  Vitals and nursing note reviewed. Constitutional:       General: He is not in acute distress. Appearance: Normal appearance. He is not toxic-appearing. HENT:      Head: Normocephalic and atraumatic. Ears:      Comments: Bilateral TMs with fluid. No erythema. Nose: Congestion and rhinorrhea present. Mouth/Throat:      Mouth: Mucous membranes are moist.      Pharynx: Oropharynx is clear. Eyes:      Extraocular Movements: Extraocular movements intact. Conjunctiva/sclera: Conjunctivae normal.      Pupils: Pupils are equal, round, and reactive to light. Cardiovascular:      Rate and Rhythm: Normal rate and regular rhythm.    Pulmonary:      Effort:

## 2023-05-10 NOTE — PROGRESS NOTES
Patient is here for itching eyes,sneezing,and coughing going on for a month. No chief complaint on file. [unfilled]    Current Outpatient Medications on File Prior to Visit   Medication Sig Dispense Refill    diclofenac (VOLTAREN) 75 MG EC tablet Take 1 tablet by mouth as needed      methylPREDNISolone (MEDROL DOSEPACK) 4 MG tablet Use as directed (Patient not taking: Reported on 5/10/2023)      tiZANidine (ZANAFLEX) 2 MG tablet Take 1 tablet by mouth 3 times daily as needed (Patient not taking: Reported on 5/10/2023)       No current facility-administered medications on file prior to visit. Health Maintenance Due   Topic    Varicella vaccine (1 of 2 - 2-dose childhood series)    HPV vaccine (1 - Male 2-dose series)    COVID-19 Vaccine (3 - Booster)       1. \"Have you been to the ER, urgent care clinic since your last visit? Hospitalized since your last visit? \" No    2. \"Have you seen or consulted any other health care providers outside of the [address] [place] [address] since your last visit? \" No    3. For patients aged 39-70: Has the patient had a colonoscopy / FIT/ Cologuard? No      If the patient is female:    4. For patients aged 41-77: Has the patient had a mammogram within the past 2 years? No      5. For patients aged 21-65: Has the patient had a pap smear?  No

## 2023-07-12 ENCOUNTER — OFFICE VISIT (OUTPATIENT)
Age: 23
End: 2023-07-12
Payer: COMMERCIAL

## 2023-07-12 VITALS
DIASTOLIC BLOOD PRESSURE: 74 MMHG | WEIGHT: 143 LBS | TEMPERATURE: 98 F | RESPIRATION RATE: 20 BRPM | SYSTOLIC BLOOD PRESSURE: 115 MMHG | OXYGEN SATURATION: 97 % | BODY MASS INDEX: 21.18 KG/M2 | HEART RATE: 77 BPM | HEIGHT: 69 IN

## 2023-07-12 DIAGNOSIS — J30.9 ALLERGIC RHINITIS, UNSPECIFIED SEASONALITY, UNSPECIFIED TRIGGER: Primary | ICD-10-CM

## 2023-07-12 DIAGNOSIS — L64.9 ANDROGENIC ALOPECIA: ICD-10-CM

## 2023-07-12 PROCEDURE — 99213 OFFICE O/P EST LOW 20 MIN: CPT | Performed by: INTERNAL MEDICINE

## 2023-07-12 RX ORDER — FEXOFENADINE HCL AND PSEUDOEPHEDRINE HCI 180; 240 MG/1; MG/1
1 TABLET, EXTENDED RELEASE ORAL DAILY
Qty: 30 TABLET | Refills: 2 | Status: SHIPPED | OUTPATIENT
Start: 2023-07-12 | End: 2023-10-10

## 2023-07-12 RX ORDER — FINASTERIDE 1 MG/1
1 TABLET, FILM COATED ORAL DAILY
Qty: 30 TABLET | Refills: 2 | Status: SHIPPED | OUTPATIENT
Start: 2023-07-12 | End: 2023-10-10

## 2023-07-12 RX ORDER — METHYLPREDNISOLONE 4 MG/1
TABLET ORAL
Qty: 1 KIT | Refills: 0 | Status: SHIPPED | OUTPATIENT
Start: 2023-07-12 | End: 2023-07-18

## 2023-07-12 ASSESSMENT — ENCOUNTER SYMPTOMS
TROUBLE SWALLOWING: 0
RHINORRHEA: 1
COUGH: 0
SHORTNESS OF BREATH: 0
SINUS PAIN: 0
EYE PAIN: 0

## 2023-07-12 NOTE — PROGRESS NOTES
Patient is here for sick visit coughing, sore throat, and eyes burning. 1. \"Have you been to the ER, urgent care clinic since your last visit? Hospitalized since your last visit? \" No    2. \"Have you seen or consulted any other health care providers outside of the [address] [place] [address] since your last visit? \" No     3. For patients aged 43-73: Has the patient had a colonoscopy / FIT/ Cologuard? NA - based on age      If the patient is female:    4. For patients aged 43-66: Has the patient had a mammogram within the past 2 years? NA - based on age or sex      11. For patients aged 21-65: Has the patient had a pap smear?  NA - based on age or sex